# Patient Record
Sex: FEMALE | URBAN - METROPOLITAN AREA
[De-identification: names, ages, dates, MRNs, and addresses within clinical notes are randomized per-mention and may not be internally consistent; named-entity substitution may affect disease eponyms.]

---

## 2017-03-14 ENCOUNTER — OFFICE VISIT (OUTPATIENT)
Dept: FAMILY MEDICINE | Facility: CLINIC | Age: 42
End: 2017-03-14
Payer: COMMERCIAL

## 2017-03-14 VITALS
WEIGHT: 169 LBS | BODY MASS INDEX: 31.1 KG/M2 | HEART RATE: 108 BPM | SYSTOLIC BLOOD PRESSURE: 114 MMHG | DIASTOLIC BLOOD PRESSURE: 79 MMHG | HEIGHT: 62 IN | TEMPERATURE: 97.8 F

## 2017-03-14 DIAGNOSIS — N93.9 VAGINAL BLEEDING: Primary | ICD-10-CM

## 2017-03-14 LAB
ERYTHROCYTE [DISTWIDTH] IN BLOOD BY AUTOMATED COUNT: 12.3 % (ref 10–15)
HCT VFR BLD AUTO: 38.1 % (ref 35–47)
HGB BLD-MCNC: 12.7 G/DL (ref 11.7–15.7)
MCH RBC QN AUTO: 29.6 PG (ref 26.5–33)
MCHC RBC AUTO-ENTMCNC: 33.3 G/DL (ref 31.5–36.5)
MCV RBC AUTO: 89 FL (ref 78–100)
PLATELET # BLD AUTO: 387 10E9/L (ref 150–450)
RBC # BLD AUTO: 4.29 10E12/L (ref 3.8–5.2)
WBC # BLD AUTO: 10.8 10E9/L (ref 4–11)

## 2017-03-14 PROCEDURE — 99213 OFFICE O/P EST LOW 20 MIN: CPT | Performed by: FAMILY MEDICINE

## 2017-03-14 PROCEDURE — 85027 COMPLETE CBC AUTOMATED: CPT | Performed by: FAMILY MEDICINE

## 2017-03-14 PROCEDURE — 36415 COLL VENOUS BLD VENIPUNCTURE: CPT | Performed by: FAMILY MEDICINE

## 2017-03-14 RX ORDER — NORGESTIMATE AND ETHINYL ESTRADIOL 0.25-0.035
1 KIT ORAL DAILY
COMMUNITY
End: 2017-11-28

## 2017-03-14 NOTE — MR AVS SNAPSHOT
"              After Visit Summary   3/14/2017    González Ewing    MRN: [id number]           Patient Information     Date Of Birth          1975        Visit Information        Provider Department      3/14/2017 10:00 AM Yeimi Britt MD [facility]        Today's Diagnoses     Vaginal bleeding    -  1       Follow-ups after your visit        Who to contact     If you have questions or need follow up information about today's clinic visit or your schedule please contact [facility] directly at [phone].  Normal or non-critical lab and imaging results will be communicated to you by MyChart, letter or phone within 4 business days after the clinic has received the results. If you do not hear from us within 7 days, please contact the clinic through [employer] or phone. If you have a critical or abnormal lab result, we will notify you by phone as soon as possible.  Submit refill requests through [employer] or call your pharmacy and they will forward the refill request to us. Please allow 3 business days for your refill to be completed.          Additional Information About Your Visit        MyChart Information     [facility] lets you send messages to your doctor, view your test results, renew your prescriptions, schedule appointments and more. To sign up, go to [email or web address] . Click on \"Log in\" on the left side of the screen, which will take you to the Welcome page. Then click on \"Sign up Now\" on the right side of the page.     You will be asked to enter the access code listed below, as well as some personal information. Please follow the directions to create your username and password.     Your access code is: [email or web address]  Expires: 2017  6:04 PM     Your access code will  in 90 days. If you need help or a new code, please call your [facility] or [phone].        Care EveryWhere ID     This is your Care EveryWhere ID. This could be used by other organizations to " "access your [facility] medical records  [id number]        Your Vitals Were     Pulse Temperature Height Last Period BMI (Body Mass Index)       108 97.8  F (36.6  C) (Oral) 5' 1.5\" (1.562 m) 03/11/2017 31.42 kg/m2        Blood Pressure from Last 3 Encounters:   03/14/17 114/79   06/30/16 94/62   03/25/16 110/74    Weight from Last 3 Encounters:   03/14/17 169 lb (76.7 kg)   06/30/16 165 lb (74.8 kg)   03/25/16 175 lb 4 oz (79.5 kg)              We Performed the Following     CBC with platelets        Primary Care Provider Office Phone # Fax #    [facility] [phone] [address] [facility] [address]        Thank you!     Thank you for choosing [facility]  for your care. Our goal is always to provide you with excellent care. Hearing back from our patients is one way we can continue to improve our services. Please take a few minutes to complete the written survey that you may receive in the mail after your visit with us. Thank you!             Your Updated Medication List - Protect others around you: Learn how to safely use, store and throw away your medicines at www.disposemymeds.org.          This list is accurate as of: 3/14/17  6:04 PM.  Always use your most recent med list.                   Brand Name Dispense Instructions for use    norgestimate-ethinyl estradiol 0.25-35 MG-MCG per tablet    ORTHO-CYCLEN, SPRINTEC     Take 1 tablet by mouth daily       TYLENOL PO      Take 325 mg by mouth       Vitamin D (Cholecalciferol) 1000 UNITS Tabs     90 tablet    Take 1 Dose by mouth daily         "

## 2017-03-14 NOTE — PROGRESS NOTES
"  SUBJECTIVE:                                                    González Ewing is a 42 year old female who presents to clinic today for the following health issues:    Offered phone  but declined.      Was having irregular periods. Given tri-sprintec and now has heavy bleeding x 3 days. Last pap was done at [facility] with Jeannie Cabral.    Pt did not have period for 5 months, saw MD at [facility] who put her on provera 10 mg for 10 days.  She finished this on March 9.   Period started the next day  Since then has had lots of vaginal bleeding soaking about 5 regular pads per day.   Having sensation of gushing   Bleeding is getting worse per patient. Using about 5-6 pads per day.   Pt started on regular OCPs a day or two ago.      Will continue this and anticipate bleeding will lighten up.    Finish up 3 months of OCPs.  If period does not occur within 3 months, pt to follow up        Problem list and histories reviewed & adjusted, as indicated.  Additional history: as documented    Labs reviewed in EPIC    Reviewed and updated as needed this visit by clinical staff  Tobacco  Allergies  Med Hx  Surg Hx  Fam Hx  Soc Hx      Reviewed and updated as needed this visit by Provider         ROS:  Constitutional, HEENT, cardiovascular, pulmonary, gi and gu systems are negative, except as otherwise noted.    OBJECTIVE:                                                    /79  Pulse 108  Temp 97.8  F (36.6  C) (Oral)  Ht 5' 1.5\" (1.562 m)  Wt 169 lb (76.7 kg)  LMP 03/11/2017  BMI 31.42 kg/m2  Body mass index is 31.42 kg/(m^2).  GENERAL: healthy, alert and no distress  RESP: lungs clear to auscultation - no rales, rhonchi or wheezes  CV: regular rate and rhythm, normal S1 S2, no S3 or S4, no murmur, click or rub, no peripheral edema and peripheral pulses strong  ABDOMEN: soft, nontender, no hepatosplenomegaly, no masses and bowel sounds normal    Diagnostic Test Results:  CBC -   Lab Results   Component " Value Date    WBC 10.8 03/14/2017     Lab Results   Component Value Date    RBC 4.29 03/14/2017     Lab Results   Component Value Date    HGB 12.7 03/14/2017     Lab Results   Component Value Date    HCT 38.1 03/14/2017     No components found for: MCT  Lab Results   Component Value Date    MCV 89 03/14/2017     Lab Results   Component Value Date    MCH 29.6 03/14/2017     Lab Results   Component Value Date    MCHC 33.3 03/14/2017     Lab Results   Component Value Date    RDW 12.3 03/14/2017     Lab Results   Component Value Date     03/14/2017          ASSESSMENT/PLAN:                                                        1. Vaginal bleeding  As above  - CBC with platelets        Yeimi Perez MD  [facility]

## 2017-03-14 NOTE — NURSING NOTE
"Chief Complaint   Patient presents with     Vaginal Bleeding       Initial /79  Pulse 108  Temp 97.8  F (36.6  C) (Oral)  Ht 5' 1.5\" (1.562 m)  Wt 169 lb (76.7 kg)  LMP 03/11/2017  BMI 31.42 kg/m2 Estimated body mass index is 31.42 kg/(m^2) as calculated from the following:    Height as of this encounter: 5' 1.5\" (1.562 m).    Weight as of this encounter: 169 lb (76.7 kg).  Medication Reconciliation: complete     Dee Dee Conte MA      "

## 2017-03-15 ENCOUNTER — TELEPHONE (OUTPATIENT)
Dept: INTERNAL MEDICINE | Facility: CLINIC | Age: 42
End: 2017-03-15

## 2017-03-15 NOTE — LETTER
[facility]  [address] [facility] [address] [phone]          March 15, 2017    González Ewing  [address] [facility] [address]          Our records indicate that you are due for a annual female exam and pap smear.   Monitoring and managing your preventative and chronic health conditions are very important to us.     If you have received your health care elsewhere, please provide us with that information so it can be documented in your chart.    Please call [phone] or message us through your [employer] account to schedule an appointment or provide information for your chart.     I look forward to seeing you and working with you on your health care needs.         Sincerely,       SUZY Guerrero   on behalf of   Farrah Timmons MD

## 2017-11-28 ENCOUNTER — OFFICE VISIT (OUTPATIENT)
Dept: FAMILY MEDICINE | Facility: CLINIC | Age: 42
End: 2017-11-28
Payer: COMMERCIAL

## 2017-11-28 VITALS
WEIGHT: 176 LBS | OXYGEN SATURATION: 98 % | SYSTOLIC BLOOD PRESSURE: 115 MMHG | BODY MASS INDEX: 32.72 KG/M2 | HEART RATE: 109 BPM | DIASTOLIC BLOOD PRESSURE: 67 MMHG | TEMPERATURE: 97.3 F

## 2017-11-28 DIAGNOSIS — R10.84 ABDOMINAL PAIN, GENERALIZED: ICD-10-CM

## 2017-11-28 DIAGNOSIS — K59.00 CONSTIPATION, UNSPECIFIED CONSTIPATION TYPE: Primary | ICD-10-CM

## 2017-11-28 PROCEDURE — 99214 OFFICE O/P EST MOD 30 MIN: CPT | Performed by: INTERNAL MEDICINE

## 2017-11-28 RX ORDER — POLYETHYLENE GLYCOL 3350 17 G/17G
1 POWDER, FOR SOLUTION ORAL DAILY PRN
Qty: 510 G | Refills: 1 | Status: SHIPPED | OUTPATIENT
Start: 2017-11-28 | End: 2017-11-28

## 2017-11-28 RX ORDER — POLYETHYLENE GLYCOL 3350 17 G/17G
1 POWDER, FOR SOLUTION ORAL DAILY PRN
Qty: 510 G | Refills: 1 | Status: SHIPPED | OUTPATIENT
Start: 2017-11-28

## 2017-11-28 NOTE — NURSING NOTE
"Chief Complaint   Patient presents with     Abdominal Pain     pt is 5 mmonths pregnant, c/o pressure and sharp pain since 11/23/17       Initial /67  Pulse 109  Temp 97.3  F (36.3  C) (Oral)  Wt 176 lb (79.8 kg)  LMP 03/11/2017  SpO2 98%  Breastfeeding? No  BMI 32.72 kg/m2 Estimated body mass index is 32.72 kg/(m^2) as calculated from the following:    Height as of 3/14/17: 5' 1.5\" (1.562 m).    Weight as of this encounter: 176 lb (79.8 kg).  Medication Reconciliation: complete   Romaine Acosta MA      "

## 2017-11-28 NOTE — PROGRESS NOTES
SUBJECTIVE:  González Ewing is an 42 year old female who presents for abdominal pain across lower abdomen.  Started five days ago.  Starting to improve.  Taking tylenol which helps.  Also on prenatal vitamins.  Is 5 months pregnant.  No vaginal bleeding.  No cramping or contractions.  Pain improving.  Pain started suddenly, then started to feel better the next day with tylenol.  Pain worse if not take tylenol.   No diarrhea.  Is constipated - BMs are hard and painful to pass.  No blood in BMs.  No n/v.  Eating well.  No fevers, chills, or sweats.  No known exposures.  No recent travel.           has a past medical history of Irregular menses.  Social History     Social History     Marital status:      Spouse name: N/A     Number of children: N/A     Years of education: N/A     Social History Main Topics     Smoking status: Never Smoker     Smokeless tobacco: Never Used     Alcohol use No     Drug use: No     Sexual activity: Yes     Partners: Male     Other Topics Concern     None     Social History Narrative     History reviewed. No pertinent family history.    ALLERGIES:  Review of patient's allergies indicates no known allergies.    Current Outpatient Prescriptions   Medication     polyethylene glycol (MIRALAX) powder     Vitamin D, Cholecalciferol, 1000 UNITS TABS     Acetaminophen (TYLENOL PO)     No current facility-administered medications for this visit.      Facility-Administered Medications Ordered in Other Visits   Medication     sodium chloride (PF) 0.9% PF flush 80 mL         ROS:  ROS is done and is negative for general, constitutional, eye, ENT, Respiratory, cardiovascular, GI, , Skin, musculoskeletal except as noted elsewhere.      OBJECTIVE:  /67  Pulse 109  Temp 97.3  F (36.3  C) (Oral)  Wt 176 lb (79.8 kg)  LMP 03/11/2017  SpO2 98%  Breastfeeding? No  BMI 32.72 kg/m2  GENERAL APPEARANCE: Alert, in no acute distress  EYES: normal  NOSE:normal  OROPHARYNX:normal  NECK:No  adenopathy,masses or thyromegaly  RESP: normal and clear to auscultation  CV:regular rate and rhythm and no murmurs, clicks, or gallops  ABDOMEN: Pregnant.  Abdomen soft.  Mild tenderness with palpation of lower bilateral lateral abdomen, no rebound. BS normal. No masses, organomegaly  SKIN: no ulcers, lesions or rash  MUSCULOSKELETAL:Musculoskeletal normal      RESULTS  .  No results found for this or any previous visit (from the past 48 hour(s)).    ASSESSMENT/PLAN:    ASSESSMENT / PLAN:  (K59.00) Constipation, unspecified constipation type  (primary encounter diagnosis)  Comment: sxs c/w constipation  Plan: polyethylene glycol (MIRALAX) powder,         Reviewed medication instructions and side effects.  Take miralax daily for 2-3 days, then decrease as needed and use prn. Follow up if experiences side effects.. I reviewed supportive care, expected course, and signs of concern.  Follow up as needed or if she does not improve within 3 day(s) or if worsens in any way.  Reviewed red flag symptoms and is to go to the ER if experiences any of these.    (R10.84) Abdominal pain, generalized  Comment: sxs most c/w combination of constipation and muscular discomfort related to pregnancy  Plan: as above.  Keep f/u appt with ob next week.  If sxs worsen or change, f/u immediately. I reviewed supportive care, expected course, and signs of concern.  Follow up as needed or if she does not improve within 3 day(s) or if worsens in any way.  Reviewed red flag symptoms and is to go to the ER if experiences any of these.      See [facility] for orders, medications, letters, patient instructions    Denise Mahajan M.D.

## 2019-10-24 ENCOUNTER — TRANSFERRED RECORDS (OUTPATIENT)
Dept: HEALTH INFORMATION MANAGEMENT | Facility: CLINIC | Age: 44
End: 2019-10-24